# Patient Record
Sex: MALE | Employment: FULL TIME | URBAN - METROPOLITAN AREA
[De-identification: names, ages, dates, MRNs, and addresses within clinical notes are randomized per-mention and may not be internally consistent; named-entity substitution may affect disease eponyms.]

---

## 2019-10-16 ENCOUNTER — THERAPY VISIT (OUTPATIENT)
Dept: PHYSICAL THERAPY | Facility: CLINIC | Age: 64
End: 2019-10-16
Payer: OTHER MISCELLANEOUS

## 2019-10-16 DIAGNOSIS — M54.50 BILATERAL LOW BACK PAIN WITHOUT SCIATICA: ICD-10-CM

## 2019-10-16 PROCEDURE — 97161 PT EVAL LOW COMPLEX 20 MIN: CPT | Mod: GP | Performed by: PHYSICAL THERAPIST

## 2019-10-16 PROCEDURE — 97110 THERAPEUTIC EXERCISES: CPT | Mod: GP | Performed by: PHYSICAL THERAPIST

## 2019-10-16 NOTE — PROGRESS NOTES
[facility] Initial Evaluation  Subjective:  The history is provided by the patient. No  was used.   Mehrdad Leavitt being seen for Low back pain into right hip.   Problem began 2019. Where condition occurred: at work.Problem occurred: pt reports that he was at work and was throwing irrigation pipe and bent over and about 10 minutes later his back tightened up and continued to get worse during the day.  Later that night he was having increased pain and difficulty standing up from sitting position  and reported as 4/10 (9/10 at worst and 0/10 at best) on pain scale. General health as reported by patient is good. Pertinent medical history includes:  None.    Surgeries include:  Orthopedic surgery. Other surgery history details: shoulder surgery, L TKA..  Current medications:  Other.   Primary job tasks include:  Lifting/carrying, driving, repetitive tasks and pushing/pulling.  Pain is described as aching and sharp and is intermittent. Pain is worse in the P.M..  Special tests:  X-ray.     Patient is [employer] . Restrictions include:  Working in normal job without restrictions.    Barriers include:  None as reported by patient.  Red flags:  None as reported by patient.  Type of problem:  Lumbar   Condition occurred with:  Bending. This is a new condition   Problem details: Patient reports that the pain was getting better, but yesterday drove about 6 hours for a  and last night and today his pain is worse..   Patient reports pain:  Central lumbar spine and lumbar spine right. Radiates to:  Gluteals right. Associated symptoms:  Loss of motion. Symptoms are exacerbated by bending, sitting and other (transfers sit to stand) and relieved by activity/movement.                      Objective:  System         Lumbar/SI Evaluation  ROM:    AROM Lumbar:   Flexion:            Min loss with pain   Ext:                    Mod loss pain    Side Bend:        Left:  Mod  loss     Right:  Max loss pain   Rotation:           Left:  WNL    Right:  WNL  Side Glide:        Left:     Right:         Strength: poor abdomial strength  Lumbar Myotomes:  normal                Lumbar Dermtomes:  normal                Neural Tension/Mobility:      Left side:SLR or Slump  negative.   Right side:   Slump positive.  Right side:   SLR  negative.   Lumbar Palpation:    Tenderness present at Left:    Vertebral  Tenderness present at Right: Vertebral                                                     General     ROS    Assessment/Plan:    Patient is a 63 year old male with lumbar complaints.    Patient has the following significant findings with corresponding treatment plan.                Diagnosis 1:  Low back pain   Pain -  hot/cold therapy, manual therapy, self management, education, directional preference exercise and home program  Decreased ROM/flexibility - manual therapy, therapeutic exercise, therapeutic activity and home program  Decreased strength - therapeutic exercise, therapeutic activities and home program  Impaired muscle performance - neuro re-education and home program  Decreased function - therapeutic activities and home program     Cumulative Therapy Evaluation is: Low complexity.    Previous and current functional limitations:  (See Goal Flow Sheet for this information)    Short term and Long term goals: (See Goal Flow Sheet for this information)     Communication ability:  Patient appears to be able to clearly communicate and understand verbal and written communication and follow directions correctly.  Treatment Explanation - The following has been discussed with the patient:   RX ordered/plan of care  Anticipated outcomes  Possible risks and side effects  This patient would benefit from PT intervention to resume normal activities.   Rehab potential is good.    Frequency:  1 X week, once daily  Duration:  for 6 weeks  Discharge Plan:  Achieve all LTG.  Independent in home  treatment program.  Reach maximal therapeutic benefit.    Please refer to the daily flowsheet for treatment today, total treatment time and time spent performing 1:1 timed codes.

## 2019-10-16 NOTE — LETTER
[facility] [place] [facility]  [address]  [place] [address]  [phone]    2019    Re: Mehrdad Leavitt   :   1955  MRN:  [id number]   REFERRING PHYSICIAN:   Hunter Forman    [facility] [place] [facility]    Date of Initial Evaluation:  10/16/19  Visits:  Rxs Used: 1  Reason for Referral:  Bilateral low back pain without sciatica    EVALUATION SUMMARY    [facility] Initial Evaluation  Subjective:  The history is provided by the patient. No  was used.   Mehrdad Leavitt being seen for Low back pain into right hip.   Problem began 2019. Where condition occurred: at work.Problem occurred: pt reports that he was at work and was throwing irrigation pipe and bent over and about 10 minutes later his back tightened up and continued to get worse during the day.  Later that night he was having increased pain and difficulty standing up from sitting position  and reported as 4/10 (9/10 at worst and 0/10 at best) on pain scale. General health as reported by patient is good. Pertinent medical history includes:  None.    Surgeries include:  Orthopedic surgery. Other surgery history details: shoulder surgery, L TKA..  Current medications:  Other.   Primary job tasks include:  Lifting/carrying, driving, repetitive tasks and pushing/pulling.  Pain is described as aching and sharp and is intermittent. Pain is worse in the P.M..  Special tests:  X-ray.     Patient is [employer] . Restrictions include:  Working in normal job without restrictions.  Barriers include:  None as reported by patient.  Red flags:  None as reported by patient.  Type of problem:  Lumbar   Condition occurred with:  Bending. This is a new condition   Problem details: Patient reports that the pain was getting better, but yesterday drove about 6 hours for a  and last night and today his pain is worse..   Patient reports pain:  Central  lumbar spine and lumbar spine right. Radiates to:  Gluteals right. Associated symptoms:  Loss of motion. Symptoms are exacerbated by bending, sitting and other (transfers sit to stand) and relieved by activity/movement.  Objective:  Lumbar/SI Evaluation  ROM:    AROM Lumbar:   Flexion:            Min loss with pain   Ext:                    Mod loss pain    Side Bend:        Left:  Mod loss     Right:  Max loss pain   Rotation:           Left:  WNL    Right:  WNL  Side Glide:        Left:     Right:   Strength: poor abdomial strength  Lumbar Myotomes:  normal  Re: Mehrdad JUNE Ronterrence   :   1955      Lumbar Dermtomes:  normal  Neural Tension/Mobility:    Left side:SLR or Slump  negative.   Right side:   Slump positive.  Right side:   SLR  negative.   Lumbar Palpation:    Tenderness present at Left:    Vertebral  Tenderness present at Right: Vertebral  Assessment/Plan:    Patient is a 63 year old male with lumbar complaints.    Patient has the following significant findings with corresponding treatment plan.                Diagnosis 1:  Low back pain   Pain -  hot/cold therapy, manual therapy, self management, education, directional preference exercise and home program  Decreased ROM/flexibility - manual therapy, therapeutic exercise, therapeutic activity and home program  Decreased strength - therapeutic exercise, therapeutic activities and home program  Impaired muscle performance - neuro re-education and home program  Decreased function - therapeutic activities and home program  Cumulative Therapy Evaluation is: Low complexity.  Previous and current functional limitations:  (See Goal Flow Sheet for this information)    Short term and Long term goals: (See Goal Flow Sheet for this information)   Communication ability:  Patient appears to be able to clearly communicate and understand verbal and written communication and follow directions correctly.  Treatment Explanation - The following has been discussed with  the patient:   RX ordered/plan of care  Anticipated outcomes  Possible risks and side effects  This patient would benefit from PT intervention to resume normal activities.   Rehab potential is good.  Frequency:  1 X week, once daily  Duration:  for 6 weeks  Discharge Plan:  Achieve all LTG.  Independent in home treatment program.  Reach maximal therapeutic benefit.      Thank you for your referral.    INQUIRIES  Therapist: Amor Betancourt DPT  [facility] [place]  [address] [place] [address]  Phone: [phone]  Fax: [phone]

## 2019-10-24 ENCOUNTER — THERAPY VISIT (OUTPATIENT)
Dept: PHYSICAL THERAPY | Facility: CLINIC | Age: 64
End: 2019-10-24
Payer: OTHER MISCELLANEOUS

## 2019-10-24 DIAGNOSIS — M54.50 BILATERAL LOW BACK PAIN WITHOUT SCIATICA: ICD-10-CM

## 2019-10-24 PROCEDURE — 97110 THERAPEUTIC EXERCISES: CPT | Mod: GP

## 2019-11-07 ENCOUNTER — THERAPY VISIT (OUTPATIENT)
Dept: PHYSICAL THERAPY | Facility: CLINIC | Age: 64
End: 2019-11-07
Payer: OTHER MISCELLANEOUS

## 2019-11-07 DIAGNOSIS — M54.50 ACUTE BILATERAL LOW BACK PAIN WITHOUT SCIATICA: ICD-10-CM

## 2019-11-07 PROCEDURE — 97110 THERAPEUTIC EXERCISES: CPT | Mod: GP | Performed by: PHYSICAL THERAPIST

## 2019-11-07 PROCEDURE — 97112 NEUROMUSCULAR REEDUCATION: CPT | Mod: GP | Performed by: PHYSICAL THERAPIST

## 2019-11-11 ENCOUNTER — TELEPHONE (OUTPATIENT)
Dept: PHYSICAL THERAPY | Facility: CLINIC | Age: 64
End: 2019-11-11

## 2019-11-11 NOTE — TELEPHONE ENCOUNTER
Left a message with Lorrie at Dr. Forman's office.  Mehrdad stated at his third visit on 11/7 that he hurt his right shoulder and maybe his neck doing prone press ups at his second PT visit.  He was scheduled to see a provider at [facility] on 11/7/19 for his right shoulder and neck.  He did state his LB sx's were significantly improved.

## 2019-11-21 ENCOUNTER — THERAPY VISIT (OUTPATIENT)
Dept: PHYSICAL THERAPY | Facility: CLINIC | Age: 64
End: 2019-11-21
Payer: OTHER MISCELLANEOUS

## 2019-11-21 DIAGNOSIS — M54.50 ACUTE BILATERAL LOW BACK PAIN WITHOUT SCIATICA: ICD-10-CM

## 2019-11-21 PROCEDURE — 97110 THERAPEUTIC EXERCISES: CPT | Mod: GP

## 2019-11-21 PROCEDURE — 97112 NEUROMUSCULAR REEDUCATION: CPT | Mod: GP

## 2019-11-29 ENCOUNTER — THERAPY VISIT (OUTPATIENT)
Dept: PHYSICAL THERAPY | Facility: CLINIC | Age: 64
End: 2019-11-29
Payer: OTHER MISCELLANEOUS

## 2019-11-29 DIAGNOSIS — M54.50 ACUTE BILATERAL LOW BACK PAIN WITHOUT SCIATICA: ICD-10-CM

## 2019-11-29 PROCEDURE — 97110 THERAPEUTIC EXERCISES: CPT | Mod: GP | Performed by: PHYSICAL THERAPIST

## 2019-11-29 NOTE — PROGRESS NOTES
Subjective:  HPI  Oswestry Score: 4 %                 Objective:  System    Physical Exam    General     ROS    Assessment/Plan:    DISCHARGE REPORT    Progress reporting period is from 10/16/19 to 11/29/19.       SUBJECTIVE  Subjective changes noted by patient: Patient reports that he is doing well and feels he is about 90% back to normal.  Was able to work 26 hours in two days earlier this week with no pain.     Current pain level is 0/10  .     Changes in function:  Yes (See Goal flowsheet attached for changes in current functional level)  Adverse reaction to treatment or activity: None    OBJECTIVE  Changes noted in objective findings:  Yes, see below.    Objective: abdominal activation good, unable to assess lumbar ROM due to pain in his neck and right UE     ASSESSMENT/PLAN  Updated problem list and treatment plan: Diagnosis 1:  Low back pain    STG/LTGs have been met or progress has been made towards goals:  Yes (See Goal flow sheet completed today.)  Assessment of Progress: The patient has met all of their long term goals.  Self Management Plans:  Patient is independent in a home treatment program.  Patient is independent in self management of symptoms.  I have re-evaluated this patient and find that the nature, scope, duration and intensity of the therapy is appropriate for the medical condition of the patient.  Mehrdad continues to require the following intervention to meet STG and LTG's:  PT intervention is no longer required to meet STG/LTG.    Recommendations:  This patient is ready to be discharged from therapy and continue their home treatment program.    Please refer to the daily flowsheet for treatment today, total treatment time and time spent performing 1:1 timed codes.

## 2019-11-29 NOTE — LETTER
[place] [facility] [place] [facility]  [address] [place] [address]  [phone]    2019    Re: Mehrdad Leavitt   :   1955  MRN:  [id number]   REFERRING PHYSICIAN:   Hunter Forman    [facility] [place] [facility]  Date of Initial Evaluation:  10/16/2019  Visits:  Rxs Used: 5  Reason for Referral:  Acute bilateral low back pain without sciatica    DISCHARGE REPORT  Progress reporting period is from 10/16/19 to 19.       SUBJECTIVE  Subjective changes noted by patient: Patient reports that he is doing well and feels he is about 90% back to normal.  Was able to work 26 hours in two days earlier this week with no pain.     Current pain level is 0/10  .     Changes in function:  Yes (See Goal flowsheet attached for changes in current functional level)  Adverse reaction to treatment or activity: None    OBJECTIVE  Changes noted in objective findings:  Yes, see below.    Objective: abdominal activation good, unable to assess lumbar ROM due to pain in his neck and right UE     ASSESSMENT/PLAN  Updated problem list and treatment plan: Diagnosis 1:  Low back pain    STG/LTGs have been met or progress has been made towards goals:  Yes (See Goal flow sheet completed today.)  Assessment of Progress: The patient has met all of their long term goals.  Self Management Plans:  Patient is independent in a home treatment program.  Patient is independent in self management of symptoms.  I have re-evaluated this patient and find that the nature, scope, duration and intensity of the therapy is appropriate for the medical condition of the patient.  Mehrdad continues to require the following intervention to meet STG and LTG's:  PT intervention is no longer required to meet STG/LTG.    Recommendations:  This patient is ready to be discharged from therapy and continue their home treatment program.              Re: Mehrdad Leavitt   :   1955      Thank you for your  referral.    INQUIRIES  Therapist: Imani Luong   [facility] [place]  [address] [place] [address]  Phone: [phone]  Fax: [phone]

## 2020-03-25 PROBLEM — M54.50 BILATERAL LOW BACK PAIN WITHOUT SCIATICA: Status: RESOLVED | Noted: 2019-10-16 | Resolved: 2020-03-25
